# Patient Record
(demographics unavailable — no encounter records)

---

## 2025-01-20 NOTE — HISTORY OF PRESENT ILLNESS
[8] : 8 [Full time] : Work status: full time [de-identified] : 1/20/25 ESPINOZA ROSALES is a 58-year-old male presenting with right ankle pain, onset: 1/20/25 after slipping on ice and twisting his ankle.   PMH: htn, hyperlipidemia, tachycardia (xarelto), prostate CA, DM 2. Allergies: NKDA [] : no [de-identified] : weight bearing

## 2025-01-20 NOTE — ASSESSMENT
[FreeTextEntry1] : The patient was advised of the diagnosis. The natural history of the pathology was explained in full to the patient in layman's terms. All questions were answered. The risks and benefits of surgical and non-surgical treatment alternatives were explained in full to the patient.  Tall CAM walker provided with crutches for pwb ambulation Cannot take nsaids due to being on Xarelto provided Vicodin 5/325 x 5 days.  RTO in 1 week for f/u care with foot/ankle specialist.

## 2025-01-20 NOTE — IMAGING
[de-identified] : right ankle exam: mild swelling Velasquez Test: negative Homans Sign: negative TTP: distal fibula Anterior Drawer Test: negative Talar Tilt Test: negative Circumduction: negative Strength Testin/5 all planes Gait examination: wnls   foot: TTP: none Forefoot compression: nttp EHL / FHL strength: 5/5 2+ DP and PT pulses are noted. All digits are nvi with FAROM.  Right ankle 3 view xray performed 2025 non displaced distal fibula fracture.

## 2025-01-30 NOTE — HISTORY OF PRESENT ILLNESS
[6] : 6 [de-identified] : 1/30/25 58 y.o patient is here for a broken right ankle today. States on 1/20/25 he slipped on the ice and twisted it and broke it. Went to  and got an xr and doppler.  NWB in a boot and crutches.  Hx Afib onXarelto A1C 5.8

## 2025-01-30 NOTE — IMAGING
[The fracture is in acceptable alignment. There is progression in healing seen] : The fracture is in acceptable alignment. There is progression in healing seen [Lateral malleolus fracture] : Lateral malleolus fracture [There are no fractures, subluxations or dislocations. No significant abnormalities are seen] : There are no fractures, subluxations or dislocations. No significant abnormalities are seen [Right] : right toe

## 2025-01-30 NOTE — PHYSICAL EXAM
[Right] : right foot and ankle [Mild] : mild swelling of medial foot [2nd] : 2nd [3rd] : 3rd [4th] : 4th [5th] : 5th [] : no calf tenderness

## 2025-02-18 NOTE — IMAGING
[Right] : right ankle [The fracture is in acceptable alignment. There is progression in healing seen] : The fracture is in acceptable alignment. There is progression in healing seen Graft Donor Site Bandage (Optional-Leave Blank If You Don't Want In Note): Steri-strips and a pressure bandage were applied to the donor site.

## 2025-02-18 NOTE — HISTORY OF PRESENT ILLNESS
[5] : 5 [Dull/Aching] : dull/aching [de-identified] : 1/30/25 58 y.o patient is here for a broken right ankle today. States on 1/20/25 he slipped on the ice and twisted it and broke it. Went to  and got an xr and doppler.  NWB in a boot and crutches.  Hx Afib onXarelto A1C 5.8 02/18/2025: follow up right ankle NWB in cam boot Had vascular eval continue current regimen Xarelto 20  ASA 81mg 3x/wk

## 2025-02-18 NOTE — PHYSICAL EXAM
[Right] : right foot and ankle [Mild] : mild swelling of medial foot [] : no deltoid ligament tenderness

## 2025-03-04 NOTE — IMAGING
[Right] : right ankle [FreeTextEntry9] : There is a non displaced distal fibula fracture with some consolidation.

## 2025-03-04 NOTE — HISTORY OF PRESENT ILLNESS
[2] : 2 [Dull/Aching] : dull/aching [de-identified] : 1/30/25 58 y.o patient is here for a broken right ankle today. States on 1/20/25 he slipped on the ice and twisted it and broke it. Went to  and got an xr and doppler.  NWB in a boot and crutches.  Hx Afib onXarelto A1C 5.8 02/18/2025: follow up right ankle NWB in cam boot Had vascular eval continue current regimen Xarelto 20  ASA 81mg 3x/wk  03/04/2025 : follow up Right ankle WB in cam boot states he is doing better since last visit. WBAT in CAM boot.  Has been taking xarelto

## 2025-03-04 NOTE — DISCUSSION/SUMMARY
[de-identified] : May d/c cam boot wbat in airsport brace airsport brace dispensed PT/HEP Skin care and diabetic footcare discussed f/u 6 weeks  Repeat x-ray will be performed at the next office visit  (right ankle)

## 2025-03-04 NOTE — PHYSICAL EXAM
[Right] : right foot and ankle [Mild] : mild swelling of medial foot [] : no deltoid ligament tenderness [FreeTextEntry3] : dry skin

## 2025-04-15 NOTE — HISTORY OF PRESENT ILLNESS
[de-identified] : 1/30/25 58 y.o patient is here for a broken right ankle today. States on 1/20/25 he slipped on the ice and twisted it and broke it. Went to  and got an xr and doppler.  NWB in a boot and crutches.  Hx Afib onXarelto A1C 5.8 02/18/2025: follow up right ankle NWB in cam boot Had vascular eval continue current regimen Xarelto 20  ASA 81mg 3x/wk  03/04/2025 : follow up Right ankle WB in cam boot states he is doing better since last visit. WBAT in CAM boot.  Has been taking xarelto 4/15/2025: f/u right ankle. states improvement w/ PT 2x a week. WB in sneakers. Improving, Using brace 
No

## 2025-04-15 NOTE — PHYSICAL EXAM
[Right] : right foot and ankle [NL (20)] : dorsiflexion 20 degrees [NL (40)] : plantar flexion 40 degrees [5___] : eversion 5[unfilled]/5 [] : difficulty with single heel rise [2+] : dorsalis pedis pulse: 2+ [FreeTextEntry3] : dry skin [de-identified] : Balance

## 2025-06-03 NOTE — DISCUSSION/SUMMARY
[de-identified] : He is doing well and completed PT. Continue WBAT in supportive shoes. Home exercises encouraged.  Diabetic footcare and diabetic optimization discussed.  Follow-up as needed.

## 2025-06-03 NOTE — PHYSICAL EXAM
[Right] : right foot and ankle [NL (20)] : dorsiflexion 20 degrees [NL (40)] : plantar flexion 40 degrees [5___] : eversion 5[unfilled]/5 [2+] : dorsalis pedis pulse: 2+ [] : no deltoid ligament tenderness

## 2025-06-03 NOTE — HISTORY OF PRESENT ILLNESS
[de-identified] : 1/30/25 58 y.o patient is here for a broken right ankle today. States on 1/20/25 he slipped on the ice and twisted it and broke it. Went to  and got an xr and doppler.  NWB in a boot and crutches.  Hx Afib onXarelto A1C 5.8 02/18/2025: follow up right ankle NWB in cam boot Had vascular eval continue current regimen Xarelto 20  ASA 81mg 3x/wk  03/04/2025 : follow up Right ankle WB in cam boot states he is doing better since last visit. WBAT in CAM boot.  Has been taking xarelto 4/15/2025: f/u right ankle. states improvement w/ PT 2x a week. WB in sneakers. Improving, Using brace  06/03/25: Patient following up on the R ankle. Pain is minimal today. Pt states he feels discomfort laying down at night to the lateral aspect of the leg. He completed PT.  Glucose 130-160